# Patient Record
Sex: FEMALE | Race: BLACK OR AFRICAN AMERICAN | NOT HISPANIC OR LATINO | Employment: UNEMPLOYED | URBAN - METROPOLITAN AREA
[De-identification: names, ages, dates, MRNs, and addresses within clinical notes are randomized per-mention and may not be internally consistent; named-entity substitution may affect disease eponyms.]

---

## 2023-09-18 ENCOUNTER — APPOINTMENT (OUTPATIENT)
Dept: RADIOLOGY | Age: 68
End: 2023-09-18
Payer: COMMERCIAL

## 2023-09-18 ENCOUNTER — OFFICE VISIT (OUTPATIENT)
Dept: OBGYN CLINIC | Facility: CLINIC | Age: 68
End: 2023-09-18
Payer: COMMERCIAL

## 2023-09-18 VITALS
SYSTOLIC BLOOD PRESSURE: 167 MMHG | HEART RATE: 80 BPM | WEIGHT: 150 LBS | HEIGHT: 64 IN | BODY MASS INDEX: 25.61 KG/M2 | DIASTOLIC BLOOD PRESSURE: 76 MMHG

## 2023-09-18 DIAGNOSIS — M79.601 RIGHT ARM PAIN: ICD-10-CM

## 2023-09-18 DIAGNOSIS — M79.601 RIGHT ARM PAIN: Primary | ICD-10-CM

## 2023-09-18 DIAGNOSIS — M54.2 NECK PAIN ON RIGHT SIDE: ICD-10-CM

## 2023-09-18 DIAGNOSIS — M54.12 CERVICAL RADICULOPATHY: ICD-10-CM

## 2023-09-18 PROCEDURE — 99203 OFFICE O/P NEW LOW 30 MIN: CPT | Performed by: PHYSICIAN ASSISTANT

## 2023-09-18 PROCEDURE — 73030 X-RAY EXAM OF SHOULDER: CPT

## 2023-09-18 RX ORDER — METHYLPREDNISOLONE 4 MG/1
TABLET ORAL
Qty: 21 TABLET | Refills: 0 | Status: SHIPPED | OUTPATIENT
Start: 2023-09-18

## 2023-09-18 NOTE — PATIENT INSTRUCTIONS
Cervical Disc Herniation   WHAT YOU NEED TO KNOW:   Cervical disc herniation occurs when a cervical disc bulges out. Cervical discs are natural, spongy cushions between the vertebrae (bones) in your neck. The bulging disc may press on your nerves or spinal cord. DISCHARGE INSTRUCTIONS:   Seek care immediately: You suddenly have trouble breathing. You lose feeling in one or both of your arms. You are suddenly not able to move your neck, or one or both of your arms. You are not able to move one or both of your legs. Contact your healthcare provider if:   Your pain gets worse, even after you take medicine. Your voice suddenly becomes hoarse. You have trouble swallowing. You have questions or concerns about your condition or care. Medicines: You may need any of the following:  NSAIDs , such as ibuprofen, help decrease swelling and pain. NSAIDs can cause stomach bleeding or kidney problems in certain people. If you take blood thinner medicine, always ask your healthcare provider if NSAIDs are safe for you. Always read the medicine label and follow directions. Prescription pain medicine  may be given. Ask how to take this medicine safely. Muscle relaxers  decrease pain and muscle spasms. Take your medicine as directed. Contact your healthcare provider if you think your medicine is not helping or if you have side effects. Tell your provider if you are allergic to any medicine. Keep a list of the medicines, vitamins, and herbs you take. Include the amounts, and when and why you take them. Bring the list or the pill bottles to follow-up visits. Carry your medicine list with you in case of an emergency. Activity:  Your healthcare provider may have you rest in bed to prevent further injury to your neck. Ask how long you should rest and when you can return to your daily activities. Heat:  Apply heat on your neck for 20 to 30 minutes every 2 hours for as many days as directed. Heat helps decrease pain and muscle spasms. Ice:  Apply ice on your neck for 15 to 20 minutes every hour or as directed. Use an ice pack, or put crushed ice in a plastic bag. Cover it with a towel before you apply it to your skin. Ice helps prevent tissue damage and decreases swelling and pain. Physical therapy:  A physical therapist teaches you exercises to make your neck muscles stronger. A physical therapist also teaches you stretches to decrease your pain. Follow up with your healthcare provider as directed:  Write down your questions so you remember to ask them during your visits. © Copyright [employer] 2022 Information is for End User's use only and may not be sold, redistributed or otherwise used for commercial purposes. The above information is an  only. It is not intended as medical advice for individual conditions or treatments. Talk to your doctor, nurse or pharmacist before following any medical regimen to see if it is safe and effective for you.

## 2023-09-18 NOTE — PROGRESS NOTES
Orthopaedic Surgery - Office Note  Johanne Ravi (73 y.o. female)   : 1955   MRN: [id number]  Encounter Date: 2023    Chief Complaint   Patient presents with   • Right Arm - Pain         Assessment/Plan  Diagnoses and all orders for this visit:    Right arm pain  -     XR shoulder 2+ vw right; Future  -     Ambulatory Referral to Physical Therapy; Future  -     Ambulatory referral to Spine & Pain Management; Future    Cervical radiculopathy  -     Ambulatory Referral to Physical Therapy; Future  -     Ambulatory referral to Spine & Pain Management; Future    Neck pain on right side    The diagnosis as well as treatment options were reviewed with the patient in the office today. I reviewed with the patient I do not believe she has a rotator cuff pathology that would benefit from a cortisone injection, surgery, or therapy at this time. I do believe her symptoms are related to cervical neck pathology with nerve irritation radiating down the right arm into her middle finger. I would recommend initial conservative treatments of an oral steroid after risks and benefits were reviewed at length as well as providing safety precautions. In addition she will be started in physical therapy for the neck and follow-up in 6 weeks for repeat evaluation with spine and pain. Return for Recheck 6 weeks with spine/pain. History of Present Illness  This is a new patient who reports pain at the base of the right neck that radiates into the right arm. She reports the symptoms have been ongoing for months without any known trauma. She reports the pain starts at the neck at the right side radiating into the posterior shoulder down the posterior upper arm and into her middle finger. The symptoms are worse when she wakes up in the morning after sleeping all night. She denies any specific range of motion or strengthening exercise that makes them feel worse. She describes the pain as ache that is nagging.   She denies any red flag symptoms. Patient has not had any treatment. Patient has a history of right rotator cuff tendinitis and impingement for which she treated in 2021 receiving a cortisone injection and physical therapy. Review of Systems  Pertinent items are noted in HPI. All other systems were reviewed and are negative. Physical Exam  /76 (BP Location: Left arm, Patient Position: Sitting, Cuff Size: Standard)   Pulse 80   Ht 5' 4" (1.626 m)   Wt 68 kg (150 lb)   BMI 25.75 kg/m²   Cons: Appears well. No apparent distress. Psych: Alert. Oriented x3. Mood and affect normal.  On examination patient's right shoulder has a painless active and passive range of motion that is full without deficit. Her rotator cuff strength is 5 out of 5 in all planes. She has a negative drop arm test.  She has a negative impingement sign. She has no AC joint tenderness. She is nontender in the bicipital groove and has no Azam deformity. She is tender to palpation anterior right-sided paraspinal muscles and trapezius. She has sensation intact to light touch in the right upper extremity in all dermatomes. She has a positive Spurling's exam to the right with limited cervical neck range of motion to lateral rotation to the right. Neck flexion and extension range of motion is within normal limits. Her  strength is 5 out of 5. Distal radial and ulnar pulses are +2        Studies Reviewed  3 views of the right shoulder show no acute fractures or dislocations. No severe degenerative changes are seen in the right shoulder. Humerus is not high riding. No calcific tendinitis is seen. X-rays were reviewed from orthopedic standpoint will await official radiologist interpretation. Procedures  No procedures today. Medical, Surgical, Family, and Social History  The patient's medical history, family history, and social history, were reviewed and updated as appropriate. History reviewed.  No pertinent past medical history. History reviewed. No pertinent surgical history. History reviewed. No pertinent family history. Social History     Occupational History   • Not on file   Tobacco Use   • Smoking status: Never   • Smokeless tobacco: Not on file   Substance and Sexual Activity   • Alcohol use: No   • Drug use: No   • Sexual activity: Not on file       No Known Allergies    No current outpatient medications on file.       Av Calhoun PA-C